# Patient Record
Sex: FEMALE | Race: WHITE | NOT HISPANIC OR LATINO | Employment: FULL TIME | URBAN - METROPOLITAN AREA
[De-identification: names, ages, dates, MRNs, and addresses within clinical notes are randomized per-mention and may not be internally consistent; named-entity substitution may affect disease eponyms.]

---

## 2017-02-03 ENCOUNTER — TELEPHONE (OUTPATIENT)
Dept: TRANSPLANT | Facility: CLINIC | Age: 47
End: 2017-02-03

## 2017-02-03 NOTE — TELEPHONE ENCOUNTER
"Living Kidney Donor Evaluation Completed: 2017 11:13:14 CT Updated: 2017 13:29:30 CT  Donor Name: BO HANDLEY MRN: Note: : 1970 Age: 46Gender: Female Donor Height: 5  3\" Weight (lb): 140 BMI: 24.8  Donor Race:  Ethnicity: Not / Donor Preferred Language: English  Required?: No Current Marital Status: Single  Demographics: Home Address: [address] [place] [address] City: [facility] State: [place] Zip: [address] Country: [place]  Best Phone: [phone] Alt Phone: Donor Email: [email or web address] Best Phone Type: Mobile Alt Phone Type:   Preferred Contact Time(s): 09:00 AM-11:00 AM, 11:00 AM-1:00 PM, 1:00 PM-4:00 PM Preferred Contact Day(s): Mon, Tue, Wed, Thur, Fri  Donor Screen: PASSED Donor Referred by: Tx Candidate Donor self reported ABO: Unknown  Recipient Information: Recipient Name (Last, First): AMERICO INFANTE Recipient :    1958  ... Donor Relationship: Member of the same community Recipient Diagnosis: Recipient ABO:   MEDICAL HISTORY:  None Reported  MEDICATIONS:  None Reported  SURGICAL HISTORY:  None Reported  ALLERGIES:  NKDA  SOCIAL HISTORY:  EtOH: Denies  Illicit Drug Use: Denies  Tobacco: Denies  SELF-REPORTED FUNCTIONAL STATUS:  \"I am able to participate in strenuous sports such as swimming, singles tennis, football, basketball, or skiing\"  Exercise (>3X per week)  REVIEW OF ORGAN SYSTEMS: Airway or Lungs: No Blood Disorder: No Cancer: No Diabetes,Thyroid,Adrenal,Endocrine Disorder: No Digestive or Liver: No Female Health: No Heart or Circulatory System: No Immune Diseases: No Kidneys and Bladder: No Muscles,Bones,Joints: No Neuro: No Psych: No  FAMILY HISTORY: Confirmed:  Denied:  Cancer (denies)  Diabetes (denies)  Heart Disease (denies)  Hypertension (denies)  Kidney Disease (denies)  Kidney Stones (denies)  DONOR INFORMATION:  Level of Education: Associate's or technical degree complete Employment Status: Full Time Employer: [employer] " Medical Insurance Status: Has medical insurance Current Accommodation: Lives in rented accommodation Living Arrangement: With relatives other than spouse, parents Allow Disclosure to Recipient: Yes Paired Kidney Exchange Education Level: General idea Paired Kidney Exchange Participation Consent: Yes, only for mismatch Donor Motivation: Highly motivated donor  HIGH RISK BEHAVIOR:  Blood transfusion < 12 months. (NO)  Commercial sex < 12 months. (NO)  Illicit IV drug use < 5yrs. (NO)  Other high risk sexual contact < 12 months. (NO)  EMERGENCY CONTACT INFORMATION:  Primary Secondary First Name: ANALY Last Name: LING Phone Number: [phone] Relationship: Friend or Other  First Name: AMERICO  Last Name: NARESH Phone Number: [phone] Relationship: Friend or Other  REASON FOR DONATION:   LOVE  PHYSICIAN CONTACT INFORMATION:  PCP  Name: SOCO Gauthier: [facility] State: [place]  Phone:   ADDITIONAL NOTES:

## 2017-02-03 NOTE — TELEPHONE ENCOUNTER
Left message asking Maggi to return call for screening. Unknown abo. Will discuss PEP also. Will now send all Phase 1 orders and donor pkt.

## 2017-02-06 ENCOUNTER — TELEPHONE (OUTPATIENT)
Dept: TRANSPLANT | Facility: CLINIC | Age: 47
End: 2017-02-06

## 2017-02-06 DIAGNOSIS — Z00.5 TRANSPLANT DONOR EVALUATION: Primary | ICD-10-CM

## 2018-10-03 ENCOUNTER — OFFICE VISIT (OUTPATIENT)
Dept: OBGYN | Facility: CLINIC | Age: 48
End: 2018-10-03
Payer: COMMERCIAL

## 2018-10-03 VITALS
DIASTOLIC BLOOD PRESSURE: 72 MMHG | OXYGEN SATURATION: 98 % | BODY MASS INDEX: 27.57 KG/M2 | TEMPERATURE: 97.9 F | SYSTOLIC BLOOD PRESSURE: 113 MMHG | WEIGHT: 149.8 LBS | HEART RATE: 51 BPM | HEIGHT: 62 IN

## 2018-10-03 DIAGNOSIS — Z30.432 ENCOUNTER FOR IUD REMOVAL: Primary | ICD-10-CM

## 2018-10-03 PROCEDURE — 58301 REMOVE INTRAUTERINE DEVICE: CPT | Performed by: NURSE PRACTITIONER

## 2018-10-03 ASSESSMENT — PAIN SCALES - GENERAL: PAINLEVEL: NO PAIN (0)

## 2018-10-03 NOTE — LETTER
"                                                                          Affirmation of Informed Consent for Surgery or Invasive Procedure    1.  I, (print patient's name) Vinod Chavis,  1970,   a.  Agree that I will have (include both the medical term and patient words):           Chief Complaint   Patient presents with     IUD     Removal      b.  At [facility].     c.  The reason for this procedure is (medical condition):  removal.   d.  This will be done or supervised by:  RUPERTO Macedo CNP.   e.  My doctor may have help from others.  Help could include opening or closing         the wound. Help might also include taking grafts, cutting out tissue,                           implanting devices.  I have been told who will help, if known.     2.  I have talked to my doctor or health care team about:   a.  What the procedure is and what will happen.   b   How it may help me (the benefits).   c.  How it may harm me (the most likely and most serious risks).   d.  The long-term effects the procedure might have.    e.  My other choices for treatment.  The risks and benefits of those choices.    f.   What will likely happen if I say \"no\" to this procedure.    g.  How I might feel right after and how quickly I might be expected to recover.      h.  What medicines will be used to manage pain or sedate me.     3.  I agree that:  (If I do not agree with a statement, I have crossed it out and              initialed next to it.)       a.  I will ask questions.     b.  No one has promised me definite results.    c.  If serious problems are found during the procedure, the treatment may                    change.   d.  If I have \"do not resuscitate\" (DNR) wishes, I have discussed this with my                              doctor and they will be put on hold during the procedure.   e. Students and other appropriate people, approved by the facility, may watch                      the procedure and help " with tasks they are qualified for.                                                    f.  Pictures or videos may be taken. They may be used for medical or                  educational reasons only.       g. Tissues or organs removed from my body as part of the normal course of the                    procedure may be used for research or teaching purposes. They will be                  disposed of with respect.                    h.  If a staff person is exposed to my blood or body fluids, my blood will be drawn                   and tested for HIV and hepatitis.  I understand that by law, the test results will                    go:         -  In my medical record.                         -  To the Employee Occupational Health Service and/or Infection Control                                  at this facility.    -  To the [place] [facility] officials.                 i.  I may have a blood transfusion: I have talked to my doctor or care team about:    -  Why I may need a blood transfusion.     - The risks, benefits, and side effects of transfusion - and the risks of not        Having one.     -  Blood safety and other options for treatment.        Consent for blood transfusion obtained during a hospital admission is valid for the entire hospital stay.  Consent  for blood transfusion obtained in the clinic setting is valid for a year from the signature date.                                4.  I understand that:   a.  I can change my mind.  If I do, I must tell my doctor or team as soon as                           possible.              b.  The team members may change during the procedure.                c.   The team will double-check who I am.  They will ask me what I am having                         done and the site of the site of the procedure.  This is done for my safety.    My questions have been answered.  I agree to the procedure.  I have made my special needs and instructions known.      Vinod  Palmira      10/3/2018 11:03 AM  Patient (or representative)/Relationship to patient             Date  Time    For telephone consent:      10/3/2018  11:03 AM         Date  Time  Print name of patient (or representative)/relationship to patient    Witness:  I have verified that the signature is that of the patient or patient's representative and that this has been signed before the procedure:    NAME:        10/3/2018  11:03 AM         Date  Time  Person verifying patient's name or patient representative's signature     Provider:  I have discussed the procedure and the information stated above with the patient (or the patient's representative) and answered their questions. The patient or their representative consented to the procedure:      RUPERTO Macedo CNP    10/3/2018  11:03 AM  Physician or Provider's Signature(s)   Date  Time       Intepreter (if used):       10/3/2018  11:03 AM                                   Name       Language/Organization Date  Time    Consent for procedure valid for 30 days after patient signature date     [facility]  AFFIRMATION OF INFORMED CONSENT FOR SURGERY OR INVASIVE PROCEDURE               Original - Medical Records

## 2018-10-03 NOTE — MR AVS SNAPSHOT
"              After Visit Summary   10/3/2018    Vinod Chavis    MRN: [id number]           Patient Information     Date Of Birth          1970        Visit Information        Provider Department      10/3/2018 10:50 AM Rowena Chaney APRN CNP [facility]        Today's Diagnoses     Encounter for IUD removal    -  1       Follow-ups after your visit        Who to contact     If you have questions or need follow up information about today's clinic visit or your schedule please contact [facility] directly at [phone].  Normal or non-critical lab and imaging results will be communicated to you by MyChart, letter or phone within 4 business days after the clinic has received the results. If you do not hear from us within 7 days, please contact the clinic through MyChart or phone. If you have a critical or abnormal lab result, we will notify you by phone as soon as possible.  Submit refill requests through [employer] or call your pharmacy and they will forward the refill request to us. Please allow 3 business days for your refill to be completed.          Additional Information About Your Visit        Care EveryWhere ID     This is your Care EveryWhere ID. This could be used by other organizations to access your [place] medical records  [id number]        Your Vitals Were     Pulse Temperature Height Last Period Pulse Oximetry BMI (Body Mass Index)    51 97.9  F (36.6  C) (Oral) 5' 2\" (1.575 m) 09/19/2018 (Approximate) 98% 27.4 kg/m2       Blood Pressure from Last 3 Encounters:   10/03/18 113/72   05/19/11 98/62   02/18/10 102/62    Weight from Last 3 Encounters:   10/03/18 149 lb 12.8 oz (67.9 kg)   05/19/11 169 lb (76.7 kg)   02/18/10 165 lb (74.8 kg)              We Performed the Following     REMOVE INTRAUTERINE DEVICE          Today's Medication Changes          These changes are accurate as of 10/3/18 12:16 PM.  If you have any questions, ask your nurse or doctor.       "         Stop taking these medicines if you haven't already. Please contact your care team if you have questions.     amoxicillin 875 MG tablet   Commonly known as:  AMOXIL   Stopped by:  Rowena Chaney APRN CNP                    Primary Care Provider Office Phone # Fax #    [facility] [phone] [id number] [address] [facility] [address]        Equal Access to Services     [facility] Center[facility] : Hadii aad ku hadasho Soomaali, waaxda luqadaha, qaybta kaalmada adeegyada, waxay idiin hayaan adeeg kharash la'robel . So [facility] [phone].    ATENCIÓN: Si habla español, tiene a dorantes disposición servicios gratuitos de asistencia lingüística. Lllizeth al [phone].    We comply with applicable federal civil rights laws and [place] laws. We do not discriminate on the basis of race, color, national origin, age, disability, sex, sexual orientation, or gender identity.            Thank you!     Thank you for choosing [facility]  for your care. Our goal is always to provide you with excellent care. Hearing back from our patients is one way we can continue to improve our services. Please take a few minutes to complete the written survey that you may receive in the mail after your visit with us. Thank you!             Your Updated Medication List - Protect others around you: Learn how to safely use, store and throw away your medicines at www.disposemymeds.org.      Notice  As of 10/3/2018 12:16 PM    You have not been prescribed any medications.

## 2018-10-03 NOTE — NURSING NOTE
"Chief Complaint   Patient presents with     IUD     Removal       Initial /72  Pulse 51  Temp 97.9  F (36.6  C) (Oral)  Ht 5' 2\" (1.575 m)  Wt 149 lb 12.8 oz (67.9 kg)  LMP 09/19/2018 (Approximate)  SpO2 98%  BMI 27.4 kg/m2 Estimated body mass index is 27.4 kg/(m^2) as calculated from the following:    Height as of this encounter: 5' 2\" (1.575 m).    Weight as of this encounter: 149 lb 12.8 oz (67.9 kg)..  BP completed using cuff size: regular    Consent obtained today at visit, please see scanned report.         Antonina Palacios CMA      "

## 2018-10-03 NOTE — PROGRESS NOTES
"SUBJECTIVE:  Vinod Chavis is a 48 year old female who presents to the clinic today for an IUD removal. Per patient, Paragard IUD has been in place for 6-7 years, but per CE review, it was replaced August 2016. Patient was seen at her primary clinic in September to discuss irregular bleeding. Pelvic ultrasound was essentially normal, but patient prefers removal of the IUD at this time. Declines additional contraception.  Pap smear is up to date per CE. No other concerns today.    PMH/PSH/Meds/All were reviewed and updated at this visit.  ROS:4 point ROS including Respiratory, CV, GI and , other than that noted in the HPI,  is negative       OBJECTIVE:  Well appearing female in no acute distress. Answers questions and maintains eye contact appropriately.  Blood pressure 113/72, pulse 51, temperature 97.9  F (36.6  C), temperature source Oral, height 5' 2\" (1.575 m), weight 149 lb 12.8 oz (67.9 kg), last menstrual period 09/19/2018, SpO2 98 %.  PELVIC:    External genitalia: normal without lesion. Normal BUS.  Vagina: normal mucosa and rugae, no discharge.  Cervix: normal without lesion.  Uterus: small, mobile, nontender.  Adnexa: non tender, without masses    ASSESSMENT:  IUD removal.    PLAN:  Removal procedure discussed with patient and she desires to proceed. We did discuss that with this being a non-hormonal implant, the irregular bleeding may continue after removal and we reviewed other possible etiologies. Patient verbalizes understanding and desires to proceed with removal. Consent obtained.  IUD easily removed intact with a ring forceps. Pt shown the intact IUD. Reportable signs and symptoms discussed. Report any fevers or excessive cramps. Declines alternate contraception at this time, recommended condom use.    Rowena HICKEY CNP      "

## 2018-10-03 NOTE — Clinical Note
Please abstract the following data from this visit with this patient into the appropriate field in Epic:  Pap smear done on this date: 4/14/16 (approximately), by this group: [facility], results were Normal (NIL).

## 2022-08-08 ENCOUNTER — TRANSFERRED RECORDS (OUTPATIENT)
Dept: HEALTH INFORMATION MANAGEMENT | Facility: CLINIC | Age: 52
End: 2022-08-08

## 2022-08-08 ENCOUNTER — MEDICAL CORRESPONDENCE (OUTPATIENT)
Dept: HEALTH INFORMATION MANAGEMENT | Facility: CLINIC | Age: 52
End: 2022-08-08

## 2022-08-09 ENCOUNTER — TRANSCRIBE ORDERS (OUTPATIENT)
Dept: OTHER | Age: 52
End: 2022-08-09

## 2022-08-09 DIAGNOSIS — H35.52 RETINITIS PIGMENTOSA: Primary | ICD-10-CM

## 2022-08-18 DIAGNOSIS — H35.52 RP (RETINITIS PIGMENTOSA): Primary | ICD-10-CM

## 2022-10-04 DIAGNOSIS — H35.52 RP (RETINITIS PIGMENTOSA): Primary | ICD-10-CM

## 2022-10-10 ENCOUNTER — OFFICE VISIT (OUTPATIENT)
Dept: OPHTHALMOLOGY | Facility: CLINIC | Age: 52
End: 2022-10-10
Attending: OPHTHALMOLOGY
Payer: COMMERCIAL

## 2022-10-10 DIAGNOSIS — H35.52 RETINITIS PIGMENTOSA: ICD-10-CM

## 2022-10-10 DIAGNOSIS — H35.52 RP (RETINITIS PIGMENTOSA): ICD-10-CM

## 2022-10-10 PROCEDURE — 92250 FUNDUS PHOTOGRAPHY W/I&R: CPT | Performed by: OPHTHALMOLOGY

## 2022-10-10 PROCEDURE — 99207 FUNDUS AUTOFLUORESCENCE IMAGE (FAF) OU (BOTH EYES): CPT | Performed by: OPHTHALMOLOGY

## 2022-10-10 PROCEDURE — G0463 HOSPITAL OUTPT CLINIC VISIT: HCPCS | Mod: 25

## 2022-10-10 PROCEDURE — 92134 CPTRZ OPH DX IMG PST SGM RTA: CPT | Performed by: OPHTHALMOLOGY

## 2022-10-10 PROCEDURE — 99204 OFFICE O/P NEW MOD 45 MIN: CPT | Performed by: OPHTHALMOLOGY

## 2022-10-10 RX ORDER — LEVOTHYROXINE SODIUM 50 UG/1
50 CAPSULE ORAL DAILY
Qty: 90 CAPSULE | Refills: 3 | COMMUNITY
Start: 2022-08-19 | End: 2023-08-19

## 2022-10-10 ASSESSMENT — VISUAL ACUITY
OS_CC: 20/30
METHOD: SNELLEN - LINEAR
OD_CC+: -2
OD_CC: 20/30
CORRECTION_TYPE: GLASSES
OD_PH_CC: 20/30
OS_CC+: -2

## 2022-10-10 ASSESSMENT — REFRACTION_WEARINGRX
OS_AXIS: 066
SPECS_TYPE: SVL
OD_SPHERE: -4.75
OD_CYLINDER: +2.25
OS_CYLINDER: +2.00
OS_SPHERE: -4.00
OD_AXIS: 122

## 2022-10-10 ASSESSMENT — EXTERNAL EXAM - RIGHT EYE: OD_EXAM: NORMAL

## 2022-10-10 ASSESSMENT — TONOMETRY
OS_IOP_MMHG: 14
OD_IOP_MMHG: 16
IOP_METHOD: TONOPEN

## 2022-10-10 ASSESSMENT — SLIT LAMP EXAM - LIDS
COMMENTS: NORMAL
COMMENTS: NORMAL

## 2022-10-10 ASSESSMENT — CONF VISUAL FIELD
OD_INFERIOR_NASAL_RESTRICTION: 3
OD_SUPERIOR_NASAL_RESTRICTION: 3
OS_INFERIOR_NASAL_RESTRICTION: 3

## 2022-10-10 ASSESSMENT — EXTERNAL EXAM - LEFT EYE: OS_EXAM: NORMAL

## 2022-10-10 NOTE — PROGRESS NOTES
.I have confirmed the patient's and reviewed Past Medical History, Past Surgical History, Social History, Family History, Problem List, Medication List and agree with Tech note.    CC: new consult Retinitis pigmentosa     HPI: patient is night blind since age 8, her dad has the same condition    Assessment/plan:   1.  Retinal dystrophy both eyes    - optos on fundus autofluorescence has mid peripheral geographic atrophy (can be seen in ESCS)   - no cystoid macular edema on Optical Coherence Tomography Scan    - ffERg with blue stimulus and  and genetic testing       RTC one month after ffERG     Artruo Walter MD PhD.  Professor & Chair

## 2022-10-10 NOTE — NURSING NOTE
Chief Complaints and History of Present Illnesses   Patient presents with     Retinitis Pigmentosa Evaluation     Chief Complaint(s) and History of Present Illness(es)     Retinitis Pigmentosa Evaluation           Comments    Pt states vision is ok up close, pt having difficulty seeing clearly in the distance.  Pt got new glasses 3 weeks ago, but is not seeing as well as she would like to.  No eye pain today. No flashes or floaters.  No redness or dryness.    ABDULKADIR Villanueva October 10, 2022 3:39 PM

## 2022-10-14 ENCOUNTER — TELEPHONE (OUTPATIENT)
Dept: OPHTHALMOLOGY | Facility: CLINIC | Age: 52
End: 2022-10-14

## 2022-10-14 NOTE — TELEPHONE ENCOUNTER
Called and spoke to Vinod     Made her an appointment for 11/ 28 @ 1pm for a ffERG     Mailed out a new pt packet     Nell Schwartz Communication Facilitator on 10/14/2022 at 10:15 AM

## 2022-11-22 DIAGNOSIS — H35.50 RETINAL DYSTROPHY: Primary | ICD-10-CM

## 2022-11-28 ENCOUNTER — ALLIED HEALTH/NURSE VISIT (OUTPATIENT)
Dept: OPHTHALMOLOGY | Facility: CLINIC | Age: 52
End: 2022-11-28
Payer: COMMERCIAL

## 2022-11-28 ENCOUNTER — TRANSFERRED RECORDS (OUTPATIENT)
Dept: OPHTHALMOLOGY | Facility: CLINIC | Age: 52
End: 2022-11-28

## 2022-11-28 ENCOUNTER — MEDICAL CORRESPONDENCE (OUTPATIENT)
Dept: OPHTHALMOLOGY | Facility: CLINIC | Age: 52
End: 2022-11-28

## 2022-11-28 DIAGNOSIS — H35.50 RETINAL DYSTROPHY: ICD-10-CM

## 2022-11-28 DIAGNOSIS — H35.52 RETINITIS PIGMENTOSA: ICD-10-CM

## 2022-11-28 PROCEDURE — 92273 FULL FIELD ERG W/I&R: CPT | Mod: 26 | Performed by: OPHTHALMOLOGY

## 2022-11-28 PROCEDURE — 92273 FULL FIELD ERG W/I&R: CPT

## 2022-11-28 PROCEDURE — 999N000103 HC STATISTIC NO CHARGE FACILITY FEE

## 2022-11-28 ASSESSMENT — VISUAL ACUITY
OS_CC: 20/25
METHOD: SNELLEN - LINEAR
OD_CC+: -1+2
OS_CC+: -2+2
OD_CC: 20/30

## 2022-11-28 ASSESSMENT — REFRACTION_WEARINGRX
OD_CYLINDER: +2.25
OD_AXIS: 122
OS_CYLINDER: +2.00
SPECS_TYPE: SVL
OD_SPHERE: -4.75
OS_AXIS: 066
OS_SPHERE: -4.00

## 2022-11-28 NOTE — NURSING NOTE
Returns for ffERG w/extended protocol.  Prev ffERG 02-29-02 on referral from Dr. Alexa Valerio/[facility]; see [facility] for report but no outside notes available in Epic.  Pt does not recall this first ffERG testing.   Prev seen by Dr. 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 Upon further questioning she does report difficulty seeing at night and in low light, but she has not appreciated a reduction in visual field. When asked about vision problems in the family, she says her father also had poor night vision with problems similar to hers. She also saw a specialist in her home country who diagnosed her with a heritable retinal disease. 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 Exam findings are consistent with a mid-peripheral RP which is known to relatively spare both the macula and the far peripheral retina.   Last eye exam w/Dr. Walter 10-10-22:  Retinal dystrophy both eyes.  Optos and FAF have mid-peripheral geographic atrophy (can be seen in ESCS).  No cystoid macular edema on OCT.  Pt states no interval changes.  PT REQUESTS THAT BLOOD BE DRAWN FOR GENETIC TESTING TODAY.  Scheduled to return to [facility] ([facility]) 01-09; will await results.

## 2022-11-28 NOTE — PROGRESS NOTES
STANDARD ffERG REPORT    ffERG Date:  2022    Referring: Ivanna Valerio/[facility]  Dr. Davdi Panchal/    RE:  Vinod Chavis  MRN:  [id number]  :  1970    CLINICAL HISTORY: Returns for ffERG with extended protocol.  Prev ffERG  on referral from Dr. Alexa Valerio/[facility]; see [facility] for report but no outside notes available in Epic.  Pt does not recall this first ffERG testing.   Prev seen by Dr. 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 Upon further questioning she does report difficulty seeing at night and in low light, but she has not appreciated a reduction in visual field. When asked about vision problems in the family, she says her father also had poor night vision with problems similar to hers. She also saw a specialist in her home country who diagnosed her with a heritable retinal disease. 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 Exam findings are consistent with a mid-peripheral RP which is known to relatively spare both the macula and the far peripheral retina.   Last eye exam w/Dr. Walter 10-10-22:  Retinal dystrophy both eyes.  Optos and FAF have mid-peripheral geographic atrophy (can be seen in ESCS).  No cystoid macular edema on OCT.     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                   Visual Acuity Right Eye : 20/30-1+2      W/gls, -4.75 + 2.25x122    Visual Acuity Left Eye : 20/25-2+2      W/gls, -4.00 + 2.00x066                                 ALL AVERAGED                  Data for Full-Field ERG  Right Eye  Left Eye   DARK-ADAPTED Patient Normal Patient   0.01 ERG (dami) b-wave amplitude ( v) 35* 70 to 354.6 40   0.01 ERG (dami) b-wave implicit time (ms) 102* 83.8 to 108.6 96         3.0 ERG (combined) a-wave  amplitude ( v) -35 -268.4 to -93.8 -39   3.0 ERG (combined) a-wave implicit time (ms) 16.1 11.9 to 20.3 19.4   3.0 ERG (combined) b-wave amplitude ( v) 52 173.9 to 400.3 56   3.0 ERG (combined) b-wave implicit time (ms) 50.5 37.2 to 56.8 58.8         10.0 ERG (brighter combined)a-wave amplitude ( v) -45 -279.4 to -113 -44   10.0 ERG (brighter combined)a-wave implicit time (ms) 15 10.6 to 15.4 13.3   10.0 ERG (brighter combined)b-wave amplitude ( v) 61 199.9 to 437.1 57   10.0 ERG (brighter combined)b-wave implicit time (ms) 48.2 36.1 to 52.7 58.8         3.0 Oscillatory Potentials  present    LIGHT-ADAPTED       3.0 Flicker (30Hz) amplitude ( v) 11 57.2 to 155.2 12   3.0 Flicker (30Hz) implicit time (ms) 34.9 23.7 to 30.3 38.3         3.0 ERG (cone) a-wave amplitude ( v) -7 -53.6 to -15.2 -4   3.0 ERG (cone) a-wave implicit time (ms) 12.2 11.7 to 16.7 12.8   3.0 ERG (cone) b-wave amplitude ( v) 13 56.6 to 191.8 7   3.0 ERG (cone) b-wave implicit time (ms) 36.6 27.4 to 34 36.6   * = manipulated cursors  parentheses = cursors at selected peaks  ---- = residual to non-measurable  xxxx = not tested      Tech notes: ffERG discussed and performed with E3 system.  Equally well-dilated ~10mm.  Slight exo/hyper left eye position and double vision in [employer].  Tolerated dtl nicely.  Equal and adequate eye opening w/no effort needed to clear dilated pupils.  Impedances low and comparable throughout.  No difficulty with blinking.  Specifically, no eyelid flutter to bright flashes and with flicker.     INTERPRETATION:  This full field electroretinogram was performed according to ISCEV standards using ESPION E3 system and DTL fiber recording electrodes. The patient tolerated the testing well.  The waveforms are fairly reproducible and well formed.  The normative values provided above represent the 95% confidence limits for a normal individual the age of the patient. The patient s responses are averaged.  There is mild asymmetry  of the responses in between both eyes.  In dark adapted conditions,  the maría-specific responses have decreased amplitudes and the implicit times are normal.  The maximal response, a combined maría and cone responses, have moderate reduced amplitudes in both eyes and the implicit time is delayed for the b-wave left eye.  With a higher intensity flash, the responses improve, but persistently reduced in both eyes.  The bright flash (scotopic 10.0) response is not electronegative. there was widened of the a and b-wave with decreased b/a wave ratio.  The oscillatory potentials are reduced bilaterally.      In light adapted conditions,  the 30-Hz flicker responses have abnormal amplitudes and the implicit time is delayed in both eyes.    The single photopic response have abnormal amplitudes and the implicit time is delayed for the b-wave in both eyes.    The on and off responses were abnormal and there was not S cone enhancement.    Conclusion:  This represents an abnormal electroretinogram consistent with the diagnosis of maría/cone dysfunction.   the etiology for this is unknown and could be consistent with retinal dystrophy. The differential diagnosis includes: post-inflammatory retinopathy, toxic retinopathy and Autoimmune Retinopathy   The on and off responses were abnormal. The S cone responses were attenuated, so not consistent with enhanced S cone S.    Clinical correlation is recommended.    A  repeat electroretinogram could be considered in 1-2 years or earlier if the clinical situation changes.     Dear Feliciano, thank you for the opportunity to provide electrophysiologic services for this patient.  Please do not hesitate to call if there should be any questions regarding these results.      Elsa Lopez MD  Professor of ophthalmology   Electrophysiology Service   Department of Ophthalmology & Visual Neurosciences   [facility]  Phone: [phone]   Fax: [phone]

## 2022-11-28 NOTE — LETTER
STANDARD ffERG REPORT    ffERG Date:  2022    Referring: Feliciano Walter MD, PhD    RE:  Vinod Chavis  MRN:  [id number]  :  1970    CLINICAL HISTORY: Returns for ffERG with extended protocol.  Prev ffERG  [place]. Alexa Valerio[place] [place] for report but no outside notes available in Epic.  Pt does not recall this first ffERG testing.   Prev seen by Dr. 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 Upon further questioning she does report difficulty seeing at night and in low light, but she has not appreciated a reduction in visual field. When asked about vision problems in the family, she says her father also had poor night vision with problems similar to hers. She also saw a specialist in her [place] who diagnosed her with a heritable retinal disease. 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 Exam findings are consistent with a mid-peripheral RP which is known to relatively spare both the macula and the far peripheral retina.   Last eye exam w/Dr. Walter 10-10-22:  Retinal dystrophy both eyes.  Optos and FAF have mid-peripheral geographic atrophy (can be seen in ESCS).  No cystoid macular edema on OCT.     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3 56   3.0 ERG (combined) b-wave implicit time (ms) 50.5 37.2 to 56.8 58.8         10.0 ERG (brighter combined)a-wave amplitude ( v) -45 -279.4 to -113 -44   10.0 ERG (brighter combined)a-wave implicit time (ms) 15 10.6 to 15.4 13.3   10.0 ERG (brighter combined)b-wave amplitude ( v) 61 199.9 to 437.1 57   10.0 ERG (brighter combined)b-wave implicit time (ms) 48.2 36.1 to 52.7 58.8         3.0 Oscillatory Potentials Reduced  Present  Reduced    LIGHT-ADAPTED       3.0 Flicker (30Hz) amplitude ( v) 11 57.2 to 155.2 12   3.0 Flicker (30Hz) implicit time (ms) 34.9 23.7 to 30.3 38.3         3.0 ERG (cone) a-wave amplitude ( v) -7 -53.6 to -15.2 -4   3.0 ERG (cone) a-wave implicit time (ms) 12.2 11.7 to 16.7 12.8   3.0 ERG (cone) b-wave amplitude ( v) 13 56.6 to 191.8 7   3.0 ERG (cone) b-wave implicit time (ms) 36.6 27.4 to 34 36.6   * = manipulated cursors  parentheses = cursors at selected peaks  ---- = residual to non-measurable  xxxx = not tested      Tech notes: ffERG discussed and performed with E3 system.  Equally well-dilated ~10mm.  Slight exo/hyper left eye position and double vision in ColorDome.  Tolerated dtl nicely.  Equal and adequate eye opening w/no effort needed to clear dilated pupils.  Impedances low and comparable throughout.  No difficulty with blinking.  Specifically, no eyelid flutter to bright flashes and with flicker.     INTERPRETATION:  This full-field electroretinogram was performed according to ISCEV standards using the ESPION E3 system and DTL fiber-recording electrodes. The patient tolerated the testing well. The waveforms are fairly reproducible and well formed. The normative values provided above represent the 95% confidence limits for a normal individual the age of the patient. The patient s responses are averaged. There is mild asymmetry of the responses in between both eyes.      In dark-adapted conditions, the maría-specific responses have decreased amplitudes and the implicit times are normal.  The maximal response, a combined maría and cone response, has moderately reduced amplitudes in both eyes and the implicit time is delayed for the b-wave left eye. With a higher intensity flash the responses improve but are persistently reduced in both eyes. The bright flash (scotopic 10.0) response is not electronegative. There was widening of the a- and b-wave with decreased b/a-wave ratio. The oscillatory potentials are reduced bilaterally.      In light-adapted conditions, the 30-Hz flicker responses have abnormal amplitudes and the implicit time is delayed in both eyes. The single photopic responses have abnormal amplitudes and the implicit time is delayed for the b-wave in both eyes. The on and off responses were abnormal and there was not S-cone enhancement.    CONCLUSION:  This represents an abnormal electroretinogram consistent with the diagnosis of maría/cone dysfunction. The etiology for this is unknown and could be consistent with retinal dystrophy. The differential diagnoses include: post-inflammatory retinopathy, toxic retinopathy, and autoimmune retinopathy. The on and off responses were abnormal. The S-cone responses were attenuated, so not consistent with enhanced S-cone syndrome.    Clinical correlation is recommended.    A repeat electroretinogram could be considered in 1-2 years, or earlier if the clinical situation changes.     Dear Feliciano, thank you for the opportunity to provide electrophysiologic services for this patient.  Please do not hesitate to call if there should be any questions regarding these results.    Sincerely,    Elsa Lopez MD  Professor of ophthalmology   Electrophysiology Service   Department of Ophthalmology & Visual Neurosciences   [facility]  Phone: [phone]   Fax: [phone]     cc:  [place] [place] David Panchal/

## 2023-01-16 ENCOUNTER — OFFICE VISIT (OUTPATIENT)
Dept: OPHTHALMOLOGY | Facility: CLINIC | Age: 53
End: 2023-01-16
Attending: OPHTHALMOLOGY
Payer: COMMERCIAL

## 2023-01-16 DIAGNOSIS — H35.52 RETINITIS PIGMENTOSA: Primary | ICD-10-CM

## 2023-01-16 DIAGNOSIS — H35.50 RETINAL DYSTROPHY: ICD-10-CM

## 2023-01-16 PROCEDURE — 99212 OFFICE O/P EST SF 10 MIN: CPT | Performed by: OPHTHALMOLOGY

## 2023-01-16 PROCEDURE — G0463 HOSPITAL OUTPT CLINIC VISIT: HCPCS

## 2023-01-16 ASSESSMENT — VISUAL ACUITY
METHOD: SNELLEN - LINEAR
OS_CC: 20/30
CORRECTION_TYPE: GLASSES
OD_CC: 20/30

## 2023-01-16 ASSESSMENT — REFRACTION_WEARINGRX
SPECS_TYPE: SVL
OS_AXIS: 066
OS_CYLINDER: +2.00
OD_CYLINDER: +2.25
OD_SPHERE: -4.75
OS_SPHERE: -4.00
OD_AXIS: 122

## 2023-01-16 ASSESSMENT — CONF VISUAL FIELD
OS_INFERIOR_NASAL_RESTRICTION: 3
OD_INFERIOR_NASAL_RESTRICTION: 3

## 2023-01-16 ASSESSMENT — TONOMETRY
OS_IOP_MMHG: 16
OD_IOP_MMHG: 16
IOP_METHOD: ICARE

## 2023-01-16 NOTE — NURSING NOTE
Chief Complaints and History of Present Illnesses   Patient presents with     Retinitis Pigmentosa Follow Up     Chief Complaint(s) and History of Present Illness(es)     Retinitis Pigmentosa Follow Up            Laterality: both eyes    Onset: 3 months ago          Comments    Pt. States that she is doing well. No change in VA BE. No pain BE.   María Knutson COT 12:57 PM January 16, 2023

## 2023-01-16 NOTE — PROGRESS NOTES
Patient here for results.  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  Professor & Chair          STANDARD ffERG REPORT     ffERG Date:  2022     Referring: Feliciano Walter MD, PhD     RE:          Vinod Chavis  MRN:     [id number]  :      1970     CLINICAL HISTORY: Returns for ffERG with extended protocol.  Prev ffERG  on referral from Dr. Alexa Valerio/[facility] [place]; see [place] for report but no outside notes available in Epic.  Pt does not recall this first ffERG testing.   Prev seen by Dr. 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 Upon further questioning she does report difficulty seeing at night and in low light, but she has not appreciated a reduction in visual field. When asked about vision problems in the family, she says her father also had poor night vision with problems similar to hers. She also saw a specialist in her home country who diagnosed her with a heritable retinal disease. 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 Exam findings are consistent with a mid-peripheral RP which is known to relatively spare both the macula and the far peripheral retina.   Last eye exam w/Dr. Walter 10-10-22:  Retinal dystrophy both eyes.  Optos and FAF have mid-peripheral geographic atrophy (can be seen in ESCS).  No cystoid macular edema on OCT.      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                                                                                                                                                                                                                     Visual Acuity:     Right Eye:            20/30-1+2                                                              W/gls, -4.75 +2.25 x 122                                   Left Eye:              20/25-2+2                                                              W/gls, -4.00 +2.00 x 066                                                                                                                                                                                          ALL AVERAGED                  Data for Full-Field ERG                                            Right Eye   Left Eye   DARK-ADAPTED Patient Normal Patient   0.01 ERG (maría) b-wave amplitude ( v) 35* 70 to 354.6 40   0.01 ERG (maría) b-wave implicit time (ms) 102* 83.8 to 108.6 96             3.0 ERG (combined) a-wave amplitude ( v) -35 -268.4 to -93.8 -39   3.0 ERG (combined) a-wave implicit time (ms) 16.1 11.9 to 20.3 19.4   3.0 ERG (combined) b-wave amplitude ( v) 52 173.9 to 400.3 56   3.0 ERG (combined) b-wave implicit time (ms) 50.5 37.2 to 56.8 58.8             10.0 ERG (brighter combined)a-wave amplitude ( v) -45 -279.4 to -113 -44   10.0 ERG (brighter combined)a-wave implicit time (ms) 15 10.6 to 15.4 13.3   10.0 ERG (brighter combined)b-wave amplitude ( v) 61 199.9 to 437.1 57   10.0 ERG (brighter combined)b-wave implicit time (ms) 48.2 36.1 to 52.7 58.8             3.0 Oscillatory Potentials Reduced  Present  Reduced    LIGHT-ADAPTED                                                                   3.0 Flicker (30Hz) amplitude ( v) 11 57.2 to 155.2 12   3.0 Flicker (30Hz) implicit time (ms) 34.9 23.7 to 30.3 38.3             3.0 ERG (cone) a-wave amplitude ( v) -7 -53.6 to -15.2 -4   3.0 ERG (cone) a-wave implicit time (ms) 12.2 11.7 to 16.7 12.8   3.0 ERG (cone) b-wave amplitude ( v) 13 56.6 to 191.8 7   3.0 ERG (cone) b-wave implicit time (ms) 36.6 27.4 to 34 36.6    * = manipulated cursors  parentheses = cursors at selected peaks  ---- = residual to non-measurable  xxxx = not tested       Tech notes: ffERG discussed and performed with E3 system.  Equally well-dilated ~10mm.  Slight exo/hyper left eye position and double vision in [employer]ome.  Tolerated dtl nicely.  Equal and adequate eye opening w/no effort needed to clear dilated pupils.  Impedances low and comparable throughout.  No difficulty with blinking.  Specifically, no eyelid flutter to bright flashes and with flicker.      INTERPRETATION:  This full-field electroretinogram was performed according to ISCEV standards using the ESPION E3 system and DTL fiber-recording electrodes. The patient tolerated the testing well. The waveforms are fairly reproducible and well formed. The normative values provided above represent the 95% confidence limits for a normal individual the age of the patient. The patient s responses are averaged. There is mild asymmetry of the responses in between both eyes.      In dark-adapted conditions, the maría-specific responses have decreased amplitudes and the implicit times are normal.  The maximal response, a combined maría and cone response, has moderately reduced amplitudes in both eyes and the implicit time is delayed for the b-wave left eye. With a higher intensity flash the responses improve but are persistently reduced in both eyes. The bright flash (scotopic 10.0) response is not electronegative. There was widening of the a- and b-wave with decreased b/a-wave ratio. The oscillatory potentials are reduced bilaterally.       In light-adapted conditions, the 30-Hz flicker responses have abnormal amplitudes and the implicit time is delayed in both eyes. The single photopic responses have abnormal amplitudes and the implicit time is delayed for the b-wave in both eyes. The on and off responses were abnormal and there was not S-cone enhancement.     CONCLUSION:  This represents an abnormal  electroretinogram consistent with the diagnosis of maría/cone dysfunction. The etiology for this is unknown and could be consistent with retinal dystrophy. The differential diagnoses include: post-inflammatory retinopathy, toxic retinopathy, and autoimmune retinopathy. The on and off responses were abnormal. The S-cone responses were attenuated, so not consistent with enhanced S-cone syndrome.     Clinical correlation is recommended.     A repeat electroretinogram could be considered in 1-2 years, or earlier if the clinical situation changes.     Dear Feliciano, thank you for the opportunity to provide electrophysiologic services for this patient.  Please do not hesitate to call if there should be any questions regarding these results.     Sincerely,     Elsa Lopez MD  Professor of ophthalmology   Electrophysiology Service   Department of Ophthalmology & Visual Neurosciences   [facility]  Phone: [phone]   Fax: [phone]      cc:  Dr. Alexa Valerio/[facility] [place]  Dr. David Panchal/

## 2024-02-05 DIAGNOSIS — H35.52 RETINITIS PIGMENTOSA: Primary | ICD-10-CM

## 2024-05-28 DIAGNOSIS — H35.52 RETINITIS PIGMENTOSA: Primary | ICD-10-CM
